# Patient Record
Sex: MALE | Race: WHITE
[De-identification: names, ages, dates, MRNs, and addresses within clinical notes are randomized per-mention and may not be internally consistent; named-entity substitution may affect disease eponyms.]

---

## 2017-01-14 ENCOUNTER — HOSPITAL ENCOUNTER (EMERGENCY)
Dept: HOSPITAL 10 - E/R | Age: 32
LOS: 1 days | Discharge: HOME | End: 2017-01-15
Payer: COMMERCIAL

## 2017-01-14 VITALS — HEIGHT: 60 IN | BODY MASS INDEX: 34.63 KG/M2 | WEIGHT: 176.37 LBS

## 2017-01-14 DIAGNOSIS — R11.0: ICD-10-CM

## 2017-01-14 DIAGNOSIS — N13.2: Primary | ICD-10-CM

## 2017-01-14 PROCEDURE — 96372 THER/PROPH/DIAG INJ SC/IM: CPT

## 2017-01-14 PROCEDURE — 81003 URINALYSIS AUTO W/O SCOPE: CPT

## 2017-01-14 PROCEDURE — 99285 EMERGENCY DEPT VISIT HI MDM: CPT

## 2017-01-14 PROCEDURE — 81001 URINALYSIS AUTO W/SCOPE: CPT

## 2017-01-14 PROCEDURE — 74176 CT ABD & PELVIS W/O CONTRAST: CPT

## 2017-01-15 VITALS — RESPIRATION RATE: 18 BRPM | HEART RATE: 73 BPM | DIASTOLIC BLOOD PRESSURE: 76 MMHG | SYSTOLIC BLOOD PRESSURE: 132 MMHG

## 2017-01-15 LAB
ADD UMIC: YES
COLOR UR: (no result)
GLUCOSE UR STRIP-MCNC: NEGATIVE %
KETONES UR STRIP.AUTO-MCNC: NEGATIVE MG/DL
NITRITE UR QL STRIP.AUTO: NEGATIVE
RBC # UR AUTO: (no result) /UL
RBC #/AREA URNS HPF: >50 /HPF
SQUAMOUS #/AREA URNS HPF: (no result) /[HPF]
URINE BILIRUBIN (DIP): NEGATIVE
URINE TOTAL PROTEIN (DIP): NEGATIVE
UROBILINOGEN UR STRIP-ACNC: (no result) (ref 0.1–1)
WBC # UR STRIP: NEGATIVE /UL

## 2017-01-15 NOTE — RADRPT
PROCEDURE:    CT ABDOMEN/PELVIS WITHOUT CONTRAST  

 

CLINICAL INDICATION:   31-year-old male with abdominal pain. 

 

TECHNIQUE:   The study was performed utilizing a GE LightSpeed VCT 64-slice CT scanner.  Direct axia
l sections were obtained through the abdomen and pelvis without the use of intravenous contrast mate
rial. Sagittal and coronal reformations were obtained. Automated exposure control and iterative maury
nstruction techniques were utilized for this examination.  The images were reviewed on a PACS workst
ation.   CTD/vol = 16.7 mGy; Total Exam DLP = 1022.7 mGy-cm. 

 

COMPARISON:    None. 

 

FINDINGS:

 

The lung bases are unremarkable.  There is no evidence for significant pleural effusion.  The liver 
has a normal size and contour.  There is diffuse decreased density throughout the liver consistent w
ith fatty infiltration without focal areas of abnormal density. No intrahepatic nor extrahepatic lori
iary ductal dilatation is seen. The gallbladder demonstrates no wall thickening nor pericholecystic 
fluid. No biliary stones are evident. The pancreas is without areas of abnormal attenuation.  The sp
sajan is identified and has a normal size without abnormal density. The adrenal glands are unremarkab
le. There is mild-to-moderate right-sided hydroureteronephrosis with an obstructing proximal right u
reteral calculus measuring approximately 14 x 7 x 8 mm.  There is perinephric infiltration.  There i
s a punctate nonobstructing right upper pole renal catherine calculus.  The left kidney is without abnor
mal density, calculi or obstruction. The urinary bladder contains a small volume of urine. There is 

no evidence for bowel obstruction.  The appendix is visualized and is without abnormal thickening or
 surrounding inflammatory reaction. There is no significant free fluid.  There is minimal right ingu
inal hernia containing fat.  The aortoiliac vessels are without aneurysmal dilatation. The osseous s
tructures are intact. 

 

IMPRESSION:

 

1.  Diffuse fatty infiltration of the liver.

2.  Mild-to-moderate right-sided hydroureteronephrosis with an obstructing proximal right ureteral 1
4 x 7 x 8 mm calculus.

3.  Punctate nonobstructing right upper pole renal calculus.

4.  No CT evidence for appendicitis.

5.  Minimal right inguinal hernia containing fat.

_____________________________________________ 

.Jasson Dai MD, MD           Date    Time 

Electronically viewed and signed by .Jasson Dai MD, MD on 01/15/2017 03:01 

 

D:  01/15/2017 03:01  T:  01/15/2017 03:01

.JOEL/

## 2017-01-15 NOTE — ERD
ER Documentation


Chief Complaint


Date/Time


DATE: 1/15/17 


TIME: 03:15


Chief Complaint


rt lower back pain, rt flank pain today.





HPI


31-year-old man complains of right flank pain radiating to the groin 1 day 

associated with some nausea.  He states he has had similar episodes in the past 

which have resolved spontaneously.  He denies vomiting or diarrhea, no chest 

pain or shortness of breath, no penile discharge, no dysuria or hematuria.  

Patient denies recent antibiotic use.





ROS


All systems reviewed and are negative except as per history of present illness.





Medications


Home Meds


Active Scripts


Tamsulosin Hcl* (Flomax*) 0.4 Mg Cap.er.24h, 0.4 MG PO DAILY for 5 Days, #5 CAP


   Prov:CHARLOTTE COONEY MD         1/15/17


Ciprofloxacin Hcl* (Ciprofloxacin Hcl*) 500 Mg Tablet, 500 MG PO BID for 5 Days

, TAB


   Prov:CHARLOTTE COONEY MD         1/15/17


Oxycodone HCl/Acetaminophen (Percocet 5-325 mg Tablet) 1 Each Tablet, 1 EACH PO 

TID for PAIN, #15 TAB


   Prov:CHARLOTTE COONEY MD         1/15/17





PMhx/Soc


None





FmHx


Family History:  No diabetes





Physical Exam


Vitals





Vital Signs








  Date Time  Temp Pulse Resp B/P Pulse Ox O2 Delivery O2 Flow Rate FiO2


 


1/15/17 03:26  73 18 132/76 97 Room Air  


 


1/15/17 00:22 98.4 104 18 165/77 100   








Physical Exam


GENERAL: Well-developed, well-nourished, well-hydrated, in no apparent distress

, looks nontoxic in appearance


HEENT: Moist mucous membranes, pink conjunctiva, no cervical spine tenderness 

or step-off deformities, no goiter, no jaundice or icterus, extraocular 

movements intact without pain. No submandibular induration, and no pharyngeal 

erythema


NEURO: Alert and oriented 3, cranial nerves II through XII intact bilaterally, 

pupils equal round reactive to light, no focal deficits or facial asymmetry, 

sensation intact distally Strength 5/5 in upper and lower extremities 

bilaterally


CARDIAC: Regular rate and rhythm, no murmurs rubs or gallops


LUNGS: Clear bilaterally no wheezing crackles or stridor


ABDOMEN: Soft nontender, no guarding, no rigidity, no rebound, no psoas sign no 

obturator sign. Normoactive bowel sounds


SKIN: Warm and dry to touch, no abrasions, contusions, or hematomas, no 

lacerations, no ecchymosis, no target lesions, and without ulcers


EXTREMITIES: No clubbing cyanosis or edema, calves are bilaterally symmetrical, 

no Homans sign, no popliteal cord sign. Distal pulses equal and bilateral


PSYCH: Normal affect without agitation or irritability


Results 24 hrs





 Laboratory Tests








Test


  1/15/17


02:30


 


Urine Bilirubin NEGATIVE 


 


Urine Clarity CLEAR 


 


Urine Color LT. YELLOW 


 


Urine Glucose NEGATIVE% 


 


Urine Hemoglobin 3+ 


 


Urine Ketones NEGATIVE 


 


Urine Leukocyte Esterase NEGATIVE 


 


Urine Microscopic RBC >50/HPF 


 


Urine Microscopic WBC 0-2/HPF 


 


Urine Nitrite NEGATIVE 


 


Urine Specific Gravity 1.020 


 


Urine Squamous Epithelial


Cells FEW 


 


 


Urine Total Protein NEGATIVE 


 


Urine Urobilinogen 0.2  E.U./dL 


 


Urine pH 7.0 








 Current Medications








 Medications


  (Trade)  Dose


 Ordered  Sig/Sharan


 Route


 PRN Reason  Start Time


 Stop Time Status Last Admin


Dose Admin


 


 Ketorolac


 Tromethamine


  (Toradol)  30 mg  ONCE  STAT


 IM


   1/15/17 02:17


 1/15/17 02:18 DC 1/15/17 02:41


 


 


 Oxycodone/


 Acetaminophen


  (Percocet (5/


 325))  1 tab  ONCE  ONCE


 PO


   1/15/17 02:30


 1/15/17 02:31 DC 1/15/17 03:22


 











Procedures/MDM


I administered Toradol 30 mg intramuscular injection and Percocet 1 tablet p.o. 

with good pain control.





Urine analysis was positive for 3+ blood.





CT scan of the abdomen and pelvis was performed revealing right-sided hydro-

ureteronephrosis and a large proximal right ureter stone, there was no 

perinephritic fat stranding or inflammatory changes.  Please refer to 

radiologist dictation for full report.





Patient's pain is been completely controlled here in the emergency department 

and he is able to urinate without difficulty.  I do not suspect renal failure 

and the patient can be managed as an outpatient with analgesics and 

antibiotics.  Both verbal and written instructions were provided to the patient.





Differential diagnoses considered, included but not limited to renal failure 

and insufficiency, urethritis, aortic dissection, abdominal aortic aneurysm, 

sepsis, stroke, meningitis, encephalitis, pneumonia, appendicitis, cholecystitis

, bowel obstruction, pyelonephritis, nephrolithiasis, cystitis, as well as 

metabolic, hematologic, and electrolyte abnormalities. As well as abscess, 

cellulitis, fractures, and dislocations.





Patient feels much better at this time, and vital signs are normal, symptoms 

have improved. I did give strict instructions to return to the ED if symptoms 

continue or worsen, patient will otherwise follow-up with primary care 

physician. Patient understood instructions and agreed to plan.





Departure


Diagnosis:  


 Primary Impression:  


 Ureteral stone with hydronephrosis


 Additional Impression:  


 Nephrolithiasis


Condition:  Good


Patient Instructions:  Hydronephrosis Adult, Kidney Stone W/ Colic











CHARLOTTE COONEY MD Ganesh 15, 2017 03:17

## 2018-01-29 ENCOUNTER — HOSPITAL ENCOUNTER (EMERGENCY)
Dept: HOSPITAL 91 - FTE | Age: 33
Discharge: LEFT BEFORE DISCHARGE | End: 2018-01-29
Payer: SELF-PAY

## 2018-01-29 ENCOUNTER — HOSPITAL ENCOUNTER (EMERGENCY)
Age: 33
Discharge: LEFT BEFORE DISCHARGE | End: 2018-01-29

## 2018-01-29 DIAGNOSIS — Z53.21: Primary | ICD-10-CM
